# Patient Record
Sex: FEMALE | Race: BLACK OR AFRICAN AMERICAN | Employment: FULL TIME | URBAN - METROPOLITAN AREA
[De-identification: names, ages, dates, MRNs, and addresses within clinical notes are randomized per-mention and may not be internally consistent; named-entity substitution may affect disease eponyms.]

---

## 2017-03-27 ENCOUNTER — OFFICE VISIT (OUTPATIENT)
Dept: FAMILY MEDICINE CLINIC | Age: 37
End: 2017-03-27

## 2017-03-27 VITALS
BODY MASS INDEX: 30.91 KG/M2 | DIASTOLIC BLOOD PRESSURE: 73 MMHG | RESPIRATION RATE: 18 BRPM | HEIGHT: 66 IN | OXYGEN SATURATION: 99 % | TEMPERATURE: 97.2 F | WEIGHT: 192.3 LBS | HEART RATE: 64 BPM | SYSTOLIC BLOOD PRESSURE: 110 MMHG

## 2017-03-27 DIAGNOSIS — Z78.9 DISCOMFORT: ICD-10-CM

## 2017-03-27 DIAGNOSIS — N89.8 VAGINAL ITCHING: Primary | ICD-10-CM

## 2017-03-27 LAB
BILIRUB UR QL STRIP: NEGATIVE
GLUCOSE UR-MCNC: NEGATIVE MG/DL
KETONES P FAST UR STRIP-MCNC: NEGATIVE MG/DL
PH UR STRIP: 5.5 [PH] (ref 4.6–8)
PROT UR QL STRIP: NEGATIVE MG/DL
SP GR UR STRIP: 1.01 (ref 1–1.03)
UA UROBILINOGEN AMB POC: NORMAL (ref 0.2–1)
URINALYSIS CLARITY POC: CLEAR
URINALYSIS COLOR POC: NORMAL
URINE BLOOD POC: NORMAL
URINE LEUKOCYTES POC: NEGATIVE
URINE NITRITES POC: NEGATIVE

## 2017-03-27 RX ORDER — NAPROXEN 500 MG/1
500 TABLET ORAL 2 TIMES DAILY WITH MEALS
Qty: 30 TAB | Refills: 1 | Status: SHIPPED | OUTPATIENT
Start: 2017-03-27

## 2017-03-27 NOTE — MR AVS SNAPSHOT
Visit Information Date & Time Provider Department Dept. Phone Encounter #  
 3/27/2017  2:00 PM Charlette Munoz MD 0670 [facility] [phone] Follow-up Instructions Return for routine care with PCP. Upcoming Health Maintenance Date Due DTaP/Tdap/Td series (1 - Tdap) 7/22/2003 INFLUENZA AGE 9 TO ADULT 8/1/2016 PAP AKA CERVICAL CYTOLOGY 5/5/2019 Allergies as of 3/27/2017  Review Complete On: 3/27/2017 By: Charlette Munoz MD  
  
 Severity Noted Reaction Type Reactions Iodinated Contrast Media - Oral And Iv Dye  05/05/2016    Itching, Swelling Current Immunizations  Never Reviewed No immunizations on file. Not reviewed this visit You Were Diagnosed With   
  
 Codes Comments Vaginal itching    -  Primary ICD-10-CM: L29.8 ICD-9-CM: 698.1 Vitals BP Pulse Temp Resp Height(growth percentile) Weight(growth percentile) 110/73 64 97.2 °F (36.2 °C) (Oral) 18 5' 6\" (1.676 m) 192 lb 4.8 oz (87.2 kg) LMP SpO2 BMI OB Status Smoking Status 03/24/2017 (Exact Date) 99% 31.04 kg/m2 Having regular periods Current Every Day Smoker BMI and BSA Data Body Mass Index Body Surface Area 31.04 kg/m 2 2.02 m 2 Preferred Pharmacy Pharmacy Name Phone [id number] [address] [place] [phone] Your Updated Medication List  
  
Notice  As of 3/27/2017  2:54 PM  
 You have not been prescribed any medications. We Performed the Following AMB POC URINALYSIS DIP STICK AUTO W/O MICRO [98592 CPT(R)] [address] [id number] Custom] Follow-up Instructions Return for routine care with PCP. Patient Instructions I will call you with lab results. Introducing [facility]!    
 Lindsey Powell introduces [employer] patient portal. Now you can access parts of your medical record, email your doctor's office, and request medication refills online. 1. In your internet browser, go to [email or web address] 2. Click on the First Time User? Click Here link in the Sign In box. You will see the New Member Sign Up page. 3. Enter your [employer] Access Code exactly as it appears below. You will not need to use this code after youve completed the sign-up process. If you do not sign up before the expiration date, you must request a new code. · [employer] Access Code: [email or web address] Expires: 6/25/2017  1:47 PM 
 
4. Enter the last four digits of your Social Security Number (xxxx) and Date of Birth (mm/dd/yyyy) as indicated and click Submit. You will be taken to the next sign-up page. 5. Create a [employer] ID. This will be your [employer] login ID and cannot be changed, so think of one that is secure and easy to remember. 6. Create a [employer] password. You can change your password at any time. 7. Enter your Password Reset Question and Answer. This can be used at a later time if you forget your password. 8. Enter your e-mail address. You will receive e-mail notification when new information is available in [address] 9. Click Sign Up. You can now view and download portions of your medical record. 10. Click the Download Summary menu link to download a portable copy of your medical information. If you have questions, please visit the Frequently Asked Questions section of the [employer] website. Remember, [employer] is NOT to be used for urgent needs. For medical emergencies, dial 911. Now available from your iPhone and Android! Please provide this summary of care documentation to your next provider. Your primary care clinician is listed as Len Suazo. If you have any questions after today's visit, please call [phone].

## 2017-03-27 NOTE — PROGRESS NOTES
HISTORY OF PRESENT ILLNESS  Kalee Hayes is a 31 y.o. female. HPI    Pt c/o menses and itching. Is sexually active, one male partner. Feels like vagina is dry and swollen. ROS  ROS negative except for symptoms noted in HPI. Physical Exam  Gen: Oriented to person, place and time and well-developed, well-nourished and in no distress. HEENT:    Head: normocephalic and atraumatic. Eyes:  EOM are normal. Pupils equal and round. Neck:  Normal range of motion. Neck supple. Cardiovascular: normal rate, regular rhythm and normal heart sounds. Pulmonary/Chest:  Effort normal and breath sounds normal.  No respiratory distress. No wheezes, no rales. Abdominal: soft, normal  bowel sounds. Musculoskeletal:  No edema, no tenderness. No calf tenderness or edema. Neurological:  Alert, oriented to person, place and time. Skin: skin is warm and dry.  exam:  Normal female external genitalia, no lesions, ulcers or discharge present. There is noticeable erythema, tissue appears swollen. ASSESSMENT and PLAN  Follow-up Disposition:  Return for routine care with PCP. 1. Vaginal itching     - AMB POC URINALYSIS DIP STICK AUTO W/O MICRO  - NUSWAB VAGINITIS PLUS    I  have discussed the diagnosis with the patient and the intended treatment plan as seen in the above orders. Patient has provided input and agrees with goals. The patient has received an after-visit summary and questions were answered concerning future plans. I have discussed medication side effects and warnings with the patient as well.

## 2017-03-27 NOTE — PROGRESS NOTES
Chief Complaint   Patient presents with    Irregular Menses    Vaginal Itching     Pt states that she thought she had a yeast infection and took vaginal suppository during menstrual cycle for relief.    After suppository bleeding became dried and pasty and itching persist.

## 2017-03-30 LAB
A VAGINAE DNA VAG QL NAA+PROBE: ABNORMAL SCORE
BVAB2 DNA VAG QL NAA+PROBE: ABNORMAL SCORE
C ALBICANS DNA VAG QL NAA+PROBE: POSITIVE
C GLABRATA DNA VAG QL NAA+PROBE: NEGATIVE
C TRACH RRNA SPEC QL NAA+PROBE: NEGATIVE
MEGA1 DNA VAG QL NAA+PROBE: ABNORMAL SCORE
N GONORRHOEA RRNA SPEC QL NAA+PROBE: NEGATIVE
T VAGINALIS RRNA SPEC QL NAA+PROBE: NEGATIVE

## 2017-04-03 ENCOUNTER — TELEPHONE (OUTPATIENT)
Dept: FAMILY MEDICINE CLINIC | Age: 37
End: 2017-04-03

## 2017-04-03 DIAGNOSIS — N76.0 BV (BACTERIAL VAGINOSIS): Primary | ICD-10-CM

## 2017-04-03 DIAGNOSIS — B37.31 YEAST VAGINITIS: ICD-10-CM

## 2017-04-03 DIAGNOSIS — B96.89 BV (BACTERIAL VAGINOSIS): Primary | ICD-10-CM

## 2017-04-03 RX ORDER — FLUCONAZOLE 150 MG/1
150 TABLET ORAL DAILY
Qty: 1 TAB | Refills: 0 | Status: SHIPPED | OUTPATIENT
Start: 2017-04-03 | End: 2017-04-04

## 2017-04-03 RX ORDER — METRONIDAZOLE 500 MG/1
500 TABLET ORAL 2 TIMES DAILY
Qty: 14 TAB | Refills: 0 | Status: SHIPPED | OUTPATIENT
Start: 2017-04-03 | End: 2017-04-10

## 2017-04-03 NOTE — TELEPHONE ENCOUNTER
Spoke with pt advised her of recent labs, and to  meds from pharmacy. Pt verbalized understanding and had no other concerns.

## 2017-04-03 NOTE — PROGRESS NOTES
Sent metronidazole to pharmacy for BV and diflucan for yeast. Left VM, pls call pt to verify she received info

## 2017-04-03 NOTE — TELEPHONE ENCOUNTER
----- Message from Darlyn Meier MD sent at 4/3/2017  1:13 PM EDT -----  Sent metronidazole to pharmacy for BV and diflucan for yeast. Left VM, pls call pt to verify she received info

## 2017-04-26 ENCOUNTER — OFFICE VISIT (OUTPATIENT)
Dept: FAMILY MEDICINE CLINIC | Age: 37
End: 2017-04-26

## 2017-04-26 ENCOUNTER — ED HISTORICAL/CONVERTED ENCOUNTER (OUTPATIENT)
Dept: OTHER | Age: 37
End: 2017-04-26

## 2017-04-26 VITALS
HEART RATE: 88 BPM | OXYGEN SATURATION: 98 % | HEIGHT: 66 IN | DIASTOLIC BLOOD PRESSURE: 70 MMHG | BODY MASS INDEX: 30.74 KG/M2 | TEMPERATURE: 97.9 F | RESPIRATION RATE: 18 BRPM | WEIGHT: 191.3 LBS | SYSTOLIC BLOOD PRESSURE: 120 MMHG

## 2017-04-26 DIAGNOSIS — Z86.711 HISTORY OF PULMONARY EMBOLISM: ICD-10-CM

## 2017-04-26 DIAGNOSIS — M79.601 ARM PAIN, CENTRAL, RIGHT: ICD-10-CM

## 2017-04-26 DIAGNOSIS — D68.59 HYPERCOAGULABLE STATE (HCC): ICD-10-CM

## 2017-04-26 DIAGNOSIS — I26.99 RECURRENT PULMONARY EMBOLI (HCC): Primary | ICD-10-CM

## 2017-04-26 NOTE — PROGRESS NOTES
HISTORY OF PRESENT ILLNESS  Kalee Hernandez is a 31 y.o. female. HPI  Right arm pain, elbow to wrist, elbow to shoulder x 3 days. Arm is very tender to touch, looks swollen, veins on hand look larger and engorged. Denies sob or CP. She takes ibu and  Naprosyn without relief. She has a Hx of hypercoag state with PE. Age 27 on depo had first PE. Recurred again, she had a  PE between pregnancies in 2014. Was on lovenox for 3rd pregnancy. ROS  ROS negative except for symptoms noted in HPI. Physical Exam  Gen: Oriented to person, place and time and well-developed, well-nourished and in no distress. HEENT:    Head: normocephalic and atraumatic. Eyes:  EOM are normal. Pupils equal and round. Neck:  Normal range of motion. Neck supple. Cardiovascular: normal rate, regular rhythm and normal heart sounds. Pulmonary/Chest:  Effort normal and breath sounds normal.  No respiratory distress. No wheezes, no rales. Abdominal: soft, normal  bowel sounds. Musculoskeletal:  No edema, no tenderness. No calf tenderness or edema. Neurological:  Alert, oriented to person, place and time. Skin: skin is warm and dry. ASSESSMENT and PLAN  Follow-up Disposition:  Return for follow up recurrent PE with PCP. 1. Hypercoagulable state ([address]       2. History of pulmonary embolism       3. Recurrent pulmonary emboli ([address]       4. Arm pain, central, right  I advised the patient:   Go to the [facility] ER. They are expecting you. I am concerned your arm pain may be due to a blood clot and would like to have you evaluated. This may be a life threatening emergency. I  have discussed the diagnosis with the patient and the intended treatment plan as seen in the above orders. Patient has provided input and agrees with goals. The patient has received an after-visit summary and questions were answered concerning future plans.   I have discussed medication side effects and warnings with the patient as well.

## 2017-04-26 NOTE — MR AVS SNAPSHOT
Visit Information Date & Time Provider Department Dept. Phone Encounter #  
 4/26/2017  4:00 PM Westley Cheek MD [address] [place], [place] [address] [facility] [phone] Follow-up Instructions Return for follow up recurrent PE with PCP. Your Appointments 4/26/2017  4:00 PM  
ROUTINE CARE with Westley Cheek MD  
[address] Appt Note: tendonitis [address] [phone]  
[address] [address] Upcoming Health Maintenance Date Due DTaP/Tdap/Td series (1 - Tdap) 7/22/2003 INFLUENZA AGE 9 TO ADULT 8/1/2016 PAP AKA CERVICAL CYTOLOGY 5/5/2019 Allergies as of 4/26/2017  Review Complete On: 4/26/2017 By: Westley Cheek MD  
  
 Severity Noted Reaction Type Reactions Iodinated Contrast Media - Oral And Iv Dye  05/05/2016    Itching, Swelling Current Immunizations  Never Reviewed No immunizations on file. Not reviewed this visit You Were Diagnosed With   
  
 Codes Comments Recurrent pulmonary emboli (HCC)    -  Primary ICD-10-CM: I26.99 
ICD-9-CM: 415.19 Hypercoagulable state ([facility]     ICD-10-CM: Q82.72 
ICD-9-CM: 289.81 History of pulmonary embolism     ICD-10-CM: Z86.711 ICD-9-CM: V12.55 Arm pain, central, right     ICD-10-CM: M79.601 ICD-9-CM: 729.5 Vitals BP Pulse Temp Resp Height(growth percentile) Weight(growth percentile) 120/70 88 97.9 °F (36.6 °C) (Oral) 18 5' 6\" (1.676 m) 191 lb 4.8 oz (86.8 kg) LMP SpO2 BMI OB Status Smoking Status 03/24/2017 (Exact Date) 98% 30.88 kg/m2 Having regular periods Current Every Day Smoker BMI and BSA Data Body Mass Index Body Surface Area  
 30.88 kg/m 2 2.01 m 2 Preferred Pharmacy Pharmacy Name Phone [id number] [address] [place] [address] [phone] Your Updated Medication List  
  
   
This list is accurate as of: 4/26/17  3:22 PM.  Always use your most recent med list.  
  
  
  
  
 naproxen 500 mg tablet Commonly known as:  NAPROSYN Take 1 Tab by mouth two (2) times daily (with meals). For pain, swelling Follow-up Instructions Return for follow up recurrent PE with PCP. Patient Instructions Go to the [facility] ER. They are expecting you. I am concerned your arm pain may be due to a blood clot and would like to have you evaluated. This may be a life threatening emergency. Introducing [facility]! Mabel Escamilla introduces [employer] patient portal. Now you can access parts of your medical record, email your doctor's office, and request medication refills online. 1. In your internet browser, go to [email or web address] 2. Click on the First Time User? Click Here link in the Sign In box. You will see the New Member Sign Up page. 3. Enter your [employer] Access Code exactly as it appears below. You will not need to use this code after youve completed the sign-up process. If you do not sign up before the expiration date, you must request a new code. · [employer] Access Code: [email or web address] Expires: 6/25/2017  1:47 PM 
 
4. Enter the last four digits of your Social Security Number (xxxx) and Date of Birth (mm/dd/yyyy) as indicated and click Submit. You will be taken to the next sign-up page. 5. Create a [employer] ID. This will be your [employer] login ID and cannot be changed, so think of one that is secure and easy to remember. 6. Create a [employer] password. You can change your password at any time. 7. Enter your Password Reset Question and Answer. This can be used at a later time if you forget your password. 8. Enter your e-mail address. You will receive e-mail notification when new information is available in [address] 9. Click Sign Up.  You can now view and download portions of your medical record. 10. Click the Download Summary menu link to download a portable copy of your medical information. If you have questions, please visit the Frequently Asked Questions section of the [employer] website. Remember, [employer] is NOT to be used for urgent needs. For medical emergencies, dial 911. Now available from your iPhone and Android! Please provide this summary of care documentation to your next provider. Your primary care clinician is listed as Garcia Navarro. If you have any questions after today's visit, please call [phone].

## 2017-04-26 NOTE — PROGRESS NOTES
Chief Complaint   Patient presents with    Arm Pain     right     Pt c/o pain in right arm X3 days that has started radiating in both directions. Pt has tried OTC meds with no relief. Pt states that pain is increasing.

## 2017-04-26 NOTE — PATIENT INSTRUCTIONS
Go to the [facility] ER. They are expecting you. I am concerned your arm pain may be due to a blood clot and would like to have you evaluated. This may be a life threatening emergency.

## 2018-05-03 ENCOUNTER — IP HISTORICAL/CONVERTED ENCOUNTER (OUTPATIENT)
Dept: OTHER | Age: 38
End: 2018-05-03

## 2022-05-26 ENCOUNTER — HOSPITAL ENCOUNTER (EMERGENCY)
Age: 42
Discharge: HOME | End: 2022-05-26
Attending: EMERGENCY MEDICINE

## 2022-05-26 VITALS
WEIGHT: 225 LBS | BODY MASS INDEX: 36.16 KG/M2 | HEIGHT: 66 IN | RESPIRATION RATE: 18 BRPM | TEMPERATURE: 98.3 F | HEART RATE: 88 BPM | SYSTOLIC BLOOD PRESSURE: 149 MMHG | OXYGEN SATURATION: 100 % | DIASTOLIC BLOOD PRESSURE: 94 MMHG

## 2022-05-26 DIAGNOSIS — M54.31 SCIATICA OF RIGHT SIDE: Primary | ICD-10-CM

## 2022-05-26 PROCEDURE — 99283 EMERGENCY DEPT VISIT LOW MDM: CPT

## 2022-05-26 RX ORDER — CYCLOBENZAPRINE HCL 10 MG
10 TABLET ORAL
Qty: 12 TABLET | Refills: 0 | Status: SHIPPED | OUTPATIENT
Start: 2022-05-26 | End: 2022-05-31

## 2022-05-26 RX ORDER — PREDNISONE 50 MG/1
50 TABLET ORAL DAILY
Qty: 5 TABLET | Refills: 0 | Status: SHIPPED | OUTPATIENT
Start: 2022-05-26 | End: 2022-05-31

## 2022-05-26 NOTE — Clinical Note
[facility] EMERGENCY DEPT  [address] [place] [address]  [phone]    Work/School Note    Date: 5/26/2022    To Whom It May concern:    Linda Ayoub was seen and treated today in the emergency room by the following provider(s):  Attending Provider: Meryle Kin, DO. Linda Ayoub is excused from work/school on 05/26/22 and 05/27/22. She is medically clear to return to work/school on 5/28/2022.        Sincerely,          Alla Gillespie DO
[facility] EMERGENCY DEPT  [facility] [place] [address]  [phone]    Work/School Note    Date: 5/26/2022    To Whom It May concern:    Shikha Field was seen and treated today in the emergency room by the following provider(s):  Attending Provider: Ariadne Cristina DO. Shikha Field is excused from work/school on 05/26/22 and 05/27/22. She is medically clear to return to work/school on 5/28/2022.        Sincerely,          Veronica Urbina LPN
none

## 2022-05-26 NOTE — ED TRIAGE NOTES
Pt arrives with pain and tingling to right lower leg that began in February. Reports pain is achy and 8/10. States this is ongoing with no relief from OTC medications. Steady gait to triage, in no acute distress.

## 2022-05-26 NOTE — ED PROVIDER NOTES
EMERGENCY DEPARTMENT HISTORY AND PHYSICAL EXAM        Date: 5/26/2022  Patient Name: Ladi Lowe    History of Presenting Illness     Chief Complaint   Patient presents with    Leg Pain       History Provided By: Patient    HPI: Ladi Lowe, 46 y.o. female with history of dizziness, TIA who presents with right leg pain. States that pain started about 3 months ago. She stomped her foot on the ground to kill a spider. She started having pain in her right calf however the last few months it is worsened and now radiates from her calf up to her thigh and buttocks. States that is better with standing. Hurts with crossing her leg. Pain is severe, constant, burning. Denies any saddle anesthesia, fevers, history of IV drug use, cancer history. PCP: None    Current Outpatient Medications   Medication Sig Dispense Refill    cyclobenzaprine (FLEXERIL) 10 mg tablet Take 1 Tablet by mouth three (3) times daily as needed for Muscle Spasm(s) for up to 5 days. 12 Tablet 0    predniSONE (DELTASONE) 50 mg tablet Take 1 Tablet by mouth daily for 5 days. 5 Tablet 0    naproxen (NAPROSYN) 500 mg tablet Take 1 Tab by mouth two (2) times daily (with meals).  For pain, swelling 30 Tab 1       Past History     Past Medical History:  Past Medical History:   Diagnosis Date    Boils 5/5/2016    Dizziness 5/5/2016    Frequent headaches 5/5/2016    Recent change in weight 5/5/2016       Past Surgical History:  Reviewed and noncontributory    Family History:  Family History   Problem Relation Age of Onset    Cancer Brother 39        colon cancer    Cancer Other 22        lymphoma- nephew    Cancer Other 21        testicular cancer       Social History:  Social History     Tobacco Use    Smoking status: Current Every Day Smoker     Years: 10.00     Types: Cigarettes    Smokeless tobacco: Never Used    Tobacco comment: 4 cigarettes daily- trying to quit   Substance Use Topics    Alcohol use: Yes     Alcohol/week: 0.0 standard drinks    Drug use: No       Allergies: Allergies   Allergen Reactions    Iodinated Contrast Media Itching and Swelling       Review of Systems   Review of Systems   Constitutional: Negative for fever. HENT: Negative for congestion. Eyes: Negative for visual disturbance. Respiratory: Negative for shortness of breath. Cardiovascular: Positive for leg swelling. Negative for chest pain. Gastrointestinal: Negative for abdominal pain. Genitourinary: Negative for dysuria. Musculoskeletal: Negative for arthralgias. Positive for leg pain. Skin: Negative for rash. Neurological: Negative for headaches. Physical Exam   Constitutional: No acute distress. Well-nourished. Skin: No rash. ENT: No rhinorrhea. No cough. Head is normocephalic and atraumatic. Eye: No proptosis or conjunctival injections. Respiratory: No apparent respiratory distress. Gastrointestinal: Nondistended. Musculoskeletal: No obvious bony deformities. Tenderness to the right piriformis region. Tenderness in the calf and in the thigh. Ambulates without difficulty. Normal strength. No significant leg swelling. No tenderness to the lumbar spine. Psychiatric: Cooperative. Appropriate mood and affect. Diagnostic Study Results     Labs -   No results found for this or any previous visit (from the past 24 hour(s)). Radiologic Studies -   No orders to display     CT Results  (Last 48 hours)    None        CXR Results  (Last 48 hours)    None          Medical Decision Making and ED Course     I reviewed the available vital signs, nursing notes, past medical history, past surgical history, family history, and social history. Vital Signs - Reviewed the patient's vital signs. Patient Vitals for the past 12 hrs:   Temp Pulse Resp BP SpO2   05/26/22 1817 98.3 °F (36.8 °C) 84 18 (!) 153/98 100 %     Medical Decision Making:   Presented with right leg pain.  The differential diagnosis is arthritis, lumbar disc disease, sciatica, piriformis syndrome, DVT. Sandro Álvarez Patient expressed some concern for leg swelling in her right leg. I do not see any obvious notable leg swelling however due to this concern I will schedule her for a DVT ultrasound to rule out a DVT. I gave her the forms to do for this and recommended coming back tomorrow morning. I do not feel she needs empiric treatment for DVT at this time. I feel she likely is having sciatica. It most likely is from piriformis syndrome based on my exam.  I recommended follow-up with spinal surgeon since she has having persistent symptoms. I recommended return precautions including any red flag signs. Will prescribe Flexeril and prednisone for pain. Discharged home. Disposition     Discharged    DISCHARGE PLAN:  1. Current Discharge Medication List      CONTINUE these medications which have NOT CHANGED    Details   naproxen (NAPROSYN) 500 mg tablet Take 1 Tab by mouth two (2) times daily (with meals). For pain, swelling  Qty: 30 Tab, Refills: 1    Associated Diagnoses: Discomfort           2. Follow-up Information     Follow up With Specialties Details Why Contact Info    Mandi Bryant MD Orthopedic Surgery Schedule an appointment as soon as possible for a visit  This is an orthopedic doctor. [address] [place] [address]  [phone]      [facility] EMERGENCY DEPT Emergency Medicine Go today As soon as possible if symptoms worsen [address] [facility] [address] [phone]        3. Return to ED if worse     Diagnosis     Clinical impression:   1. Sciatica of right side         Attestation:  Please note that this dictation was completed with [employer], the computer voice recognition software. Quite often unanticipated grammatical, syntax, homophones, and other interpretive errors are inadvertently transcribed by the computer software. Please disregard these errors.  Please excuse any errors that have escaped final proofreading. Thank you.   July Álvarez, DO

## 2022-05-27 ENCOUNTER — HOSPITAL ENCOUNTER (OUTPATIENT)
Dept: NON INVASIVE DIAGNOSTICS | Age: 42
Discharge: HOME | End: 2022-05-27

## 2022-05-27 ENCOUNTER — TRANSCRIBE ORDER (OUTPATIENT)
Dept: REGISTRATION | Age: 42
End: 2022-05-27

## 2022-05-27 DIAGNOSIS — R60.9 SWELLING: ICD-10-CM

## 2022-05-27 DIAGNOSIS — R60.9 SWELLING: Primary | ICD-10-CM

## 2022-05-27 PROCEDURE — 93971 EXTREMITY STUDY: CPT
